# Patient Record
Sex: MALE | Employment: FULL TIME | URBAN - METROPOLITAN AREA
[De-identification: names, ages, dates, MRNs, and addresses within clinical notes are randomized per-mention and may not be internally consistent; named-entity substitution may affect disease eponyms.]

---

## 2020-07-18 ENCOUNTER — OFFICE VISIT (OUTPATIENT)
Dept: URGENT CARE | Facility: URGENT CARE | Age: 35
End: 2020-07-18
Payer: COMMERCIAL

## 2020-07-18 VITALS
DIASTOLIC BLOOD PRESSURE: 83 MMHG | TEMPERATURE: 98 F | OXYGEN SATURATION: 98 % | SYSTOLIC BLOOD PRESSURE: 123 MMHG | WEIGHT: 171 LBS | HEART RATE: 70 BPM

## 2020-07-18 DIAGNOSIS — S61.210A LACERATION OF RIGHT INDEX FINGER WITHOUT FOREIGN BODY WITHOUT DAMAGE TO NAIL, INITIAL ENCOUNTER: Primary | ICD-10-CM

## 2020-07-18 PROCEDURE — 12001 RPR S/N/AX/GEN/TRNK 2.5CM/<: CPT | Performed by: FAMILY MEDICINE

## 2020-07-18 NOTE — PROGRESS NOTES
CHIEF COMPLAINT    Laceration dex      HISTORY    Cut with a clean knife while working on james today.    Tetanus is current.      There is no problem list on file for this patient.      REVIEW OF SYSTEMS    Unremarkable except as above.      EXAM  /83   Pulse 70   Temp 98  F (36.7  C) (Tympanic)   Wt 77.6 kg (171 lb)   SpO2 98%     1.5 cm laceration distal L index.  Motor, sensory intact.      Procedure - repair.  Verbal consent obtained.  Anes - Lido 1% plain 2.5 ml local  Prep with Hibiclens, saline irrig.  Closed with 3 3-0 Nylon sutures.  Bandaged.  Well tolerated.      (S61.210A) Laceration of right index finger without foreign body without damage to nail, initial encounter  (primary encounter diagnosis)  Comment:   Plan: REPAIR SUPERFICIAL, WOUND BODY < =2.5CM        Care instructions discussed.  Sutures out in 10 days.